# Patient Record
Sex: FEMALE
[De-identification: names, ages, dates, MRNs, and addresses within clinical notes are randomized per-mention and may not be internally consistent; named-entity substitution may affect disease eponyms.]

---

## 2019-12-30 ENCOUNTER — APPOINTMENT (OUTPATIENT)
Dept: OBGYN | Facility: CLINIC | Age: 39
End: 2019-12-30
Payer: COMMERCIAL

## 2019-12-30 PROCEDURE — 99385 PREV VISIT NEW AGE 18-39: CPT

## 2021-07-12 ENCOUNTER — APPOINTMENT (OUTPATIENT)
Dept: OBGYN | Facility: CLINIC | Age: 41
End: 2021-07-12
Payer: COMMERCIAL

## 2021-07-12 PROCEDURE — 99072 ADDL SUPL MATRL&STAF TM PHE: CPT

## 2021-07-12 PROCEDURE — 99396 PREV VISIT EST AGE 40-64: CPT

## 2022-07-20 PROBLEM — Z00.00 ENCOUNTER FOR PREVENTIVE HEALTH EXAMINATION: Status: ACTIVE | Noted: 2022-07-20

## 2022-08-29 ENCOUNTER — APPOINTMENT (OUTPATIENT)
Dept: OBGYN | Facility: CLINIC | Age: 42
End: 2022-08-29

## 2022-08-29 VITALS
WEIGHT: 158.07 LBS | DIASTOLIC BLOOD PRESSURE: 83 MMHG | HEART RATE: 82 BPM | HEIGHT: 66 IN | BODY MASS INDEX: 25.4 KG/M2 | SYSTOLIC BLOOD PRESSURE: 128 MMHG

## 2022-08-29 DIAGNOSIS — Z12.4 ENCOUNTER FOR SCREENING FOR MALIGNANT NEOPLASM OF CERVIX: ICD-10-CM

## 2022-08-29 DIAGNOSIS — Z11.51 ENCOUNTER FOR SCREENING FOR HUMAN PAPILLOMAVIRUS (HPV): ICD-10-CM

## 2022-08-29 DIAGNOSIS — Z83.3 FAMILY HISTORY OF DIABETES MELLITUS: ICD-10-CM

## 2022-08-29 DIAGNOSIS — R92.2 INCONCLUSIVE MAMMOGRAM: ICD-10-CM

## 2022-08-29 PROCEDURE — 99396 PREV VISIT EST AGE 40-64: CPT

## 2022-08-29 NOTE — HISTORY OF PRESENT ILLNESS
[FreeTextEntry1] : 42 year y/o  presents for annual wellness visit. She is doing well and has no complaints.  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 No current partner.\par \par GYNHx: Pt was last seen in 2021; Negative pap and HPV. Pt has a negative mammogram in 2021. Pt has an Hx of abnormal paps in the past \par OBHx:N/A\par PMHx: Anemia \par FMHx: DM - Mother \par PSHx: N/A\par Social Hx: Pt denies tobacco use, recreational use, and alcohol use \par Current Meds: N/A\par Allergies: \par NKDA\par  [TextBox_4] : Annual visit  [LMPDate] : 08/25/22

## 2022-08-29 NOTE — PLAN
[FreeTextEntry1] : HCM\par -Discussed and reviewed importance of monthly BSE\par -Pap test collected and sent at today's visit\par -Vit D/Calcium/exercise, BMD\par -Denies STI testing. Safe sexual practices discussed.\par -Mammo/sono\par -f/u PCP for recommended HCM, vaccinations and CA screening\par -discussed pregnancy and AARTI referral\par -RTO 1 year for annual\par

## 2022-08-29 NOTE — DISCUSSION/SUMMARY
[FreeTextEntry1] : 42 year y/o  presents for annual wellness visit. She is doing well and has no complaints. \par \par GYNHx: Pt was last seen in 2021; Negative pap and HPV. Pt has a negative mammogram in 2021. Pt has an Hx of abnormal paps in the past \par OBHx:N/A\par PMHx: Anemia \par FMHx: DM - Mother \par PSHx: N/A\par Social Hx: Pt denies tobacco use, recreational use, and alcohol use \par Current Meds: N/A\par Allergies: \par NKDA\par \par \par \par HCM\par -Discussed and reviewed importance of monthly BSE\par -Pap test collected and sent at today's visit\par -Vit D/Calcium/exercise, BMD\par -Denies STI testing. Safe sexual practices discussed.\par -Mammo/sono\par -f/u PCP for recommended HCM, vaccinations and CA screening\par -RTO 1 year for annual\par

## 2022-08-29 NOTE — END OF VISIT
[FreeTextEntry3] : I, Kaitlynn Chooz, acted as a scribe on behalf of Dr. Jessica Tee on 08/29/2022 .\par \par All medical entries made by the scribe were at my, Dr. Jessica Tee, direction and personally dictated by me on 08/29/2022. I have reviewed the chart and agree that the record accurately reflects my personal performance of the history, physical exam, assessment and plan. I have also personally directed, reviewed, and agreed with the chart.\par

## 2022-08-31 LAB — HPV HIGH+LOW RISK DNA PNL CVX: NOT DETECTED

## 2022-09-06 LAB — CYTOLOGY CVX/VAG DOC THIN PREP: NORMAL

## 2023-07-14 ENCOUNTER — APPOINTMENT (OUTPATIENT)
Dept: OBGYN | Facility: CLINIC | Age: 43
End: 2023-07-14
Payer: COMMERCIAL

## 2023-07-14 VITALS
WEIGHT: 156 LBS | BODY MASS INDEX: 25.07 KG/M2 | HEART RATE: 68 BPM | HEIGHT: 66 IN | SYSTOLIC BLOOD PRESSURE: 112 MMHG | OXYGEN SATURATION: 97 % | DIASTOLIC BLOOD PRESSURE: 70 MMHG

## 2023-07-14 DIAGNOSIS — Z01.419 ENCOUNTER FOR GYNECOLOGICAL EXAMINATION (GENERAL) (ROUTINE) W/OUT ABNORMAL FINDINGS: ICD-10-CM

## 2023-07-14 DIAGNOSIS — Z12.31 ENCOUNTER FOR SCREENING MAMMOGRAM FOR MALIGNANT NEOPLASM OF BREAST: ICD-10-CM

## 2023-07-14 PROCEDURE — 99396 PREV VISIT EST AGE 40-64: CPT

## 2023-07-14 NOTE — HISTORY OF PRESENT ILLNESS
[Patient reported PAP Smear was normal] : Patient reported PAP Smear was normal [FreeTextEntry1] : 07/14/2023. 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 Pt had mammogram in 09/22 2021. Pt has an Hx of abnormal paps in the past \par OBHx:N/A\par PMHx: Anemia \par FMHx: DM - Mother \par PSHx: N/A\par Social Hx: Pt denies tobacco use, recreational use, and alcohol use not currently employed looking for a new job \par Current Meds: N/A\par Allergies: NKDA\par \par  [Mammogramdate] : 09/22 [TextBox_19] : complicated cyst at O'clock, 3 cm from the nipple (3cm)  [PapSmeardate] : 08/22 [TextBox_31] : Negative for Intraepithelial lesion or malignancy

## 2023-07-14 NOTE — PLAN
[FreeTextEntry1] : Health Maintenance: 43 year old female pt presents for annual gyn exam\par BSE taught\par Reviewed diet and exercise\par Breast and pelvic exam performed\par Pap/HPV conducted (pt prefers frequent screening)\par discussed with  perimenopause sxs\par Rx given for mammogram and breast sonogram\par Advised pt to see PCP annually\par \par RTO in 1 year or PRN\par \par

## 2023-07-17 LAB — HPV HIGH+LOW RISK DNA PNL CVX: NOT DETECTED

## 2023-07-19 LAB — CYTOLOGY CVX/VAG DOC THIN PREP: NORMAL

## 2024-07-16 ENCOUNTER — APPOINTMENT (OUTPATIENT)
Dept: OBGYN | Facility: CLINIC | Age: 44
End: 2024-07-16
Payer: COMMERCIAL

## 2024-07-16 VITALS
HEIGHT: 66 IN | WEIGHT: 168 LBS | DIASTOLIC BLOOD PRESSURE: 71 MMHG | BODY MASS INDEX: 27 KG/M2 | SYSTOLIC BLOOD PRESSURE: 106 MMHG

## 2024-07-16 DIAGNOSIS — Z01.419 ENCOUNTER FOR GYNECOLOGICAL EXAMINATION (GENERAL) (ROUTINE) W/OUT ABNORMAL FINDINGS: ICD-10-CM

## 2024-07-16 PROCEDURE — 99396 PREV VISIT EST AGE 40-64: CPT

## 2024-07-17 LAB — HPV HIGH+LOW RISK DNA PNL CVX: NOT DETECTED

## 2024-07-22 LAB — CYTOLOGY CVX/VAG DOC THIN PREP: NORMAL

## 2025-08-27 ENCOUNTER — NON-APPOINTMENT (OUTPATIENT)
Age: 45
End: 2025-08-27

## 2025-08-29 ENCOUNTER — APPOINTMENT (OUTPATIENT)
Dept: OBGYN | Facility: CLINIC | Age: 45
End: 2025-08-29

## 2025-09-12 ENCOUNTER — APPOINTMENT (OUTPATIENT)
Dept: OBGYN | Facility: CLINIC | Age: 45
End: 2025-09-12
Payer: COMMERCIAL

## 2025-09-12 VITALS
SYSTOLIC BLOOD PRESSURE: 126 MMHG | DIASTOLIC BLOOD PRESSURE: 77 MMHG | WEIGHT: 157 LBS | BODY MASS INDEX: 25.23 KG/M2 | HEIGHT: 66 IN

## 2025-09-12 DIAGNOSIS — Z01.419 ENCOUNTER FOR GYNECOLOGICAL EXAMINATION (GENERAL) (ROUTINE) W/OUT ABNORMAL FINDINGS: ICD-10-CM

## 2025-09-12 PROCEDURE — 99396 PREV VISIT EST AGE 40-64: CPT

## 2025-09-15 LAB — HPV HIGH+LOW RISK DNA PNL CVX: NOT DETECTED

## 2025-09-18 LAB — CYTOLOGY CVX/VAG DOC THIN PREP: NORMAL
